# Patient Record
Sex: FEMALE | URBAN - METROPOLITAN AREA
[De-identification: names, ages, dates, MRNs, and addresses within clinical notes are randomized per-mention and may not be internally consistent; named-entity substitution may affect disease eponyms.]

---

## 2020-08-17 ENCOUNTER — APPOINTMENT (OUTPATIENT)
Age: 25
Setting detail: DERMATOLOGY
End: 2020-08-24

## 2020-08-17 VITALS — TEMPERATURE: 98.7 F

## 2020-08-17 DIAGNOSIS — L90.8 OTHER ATROPHIC DISORDERS OF SKIN: ICD-10-CM

## 2020-08-17 PROCEDURE — OTHER RESTYLANE INJECTION: OTHER

## 2020-08-17 NOTE — PROCEDURE: RESTYLANE INJECTION
Expiration Date (Month Year): 05/31/2022 You can access the FollowMyHealth Patient Portal offered by [facility] by registering at the following website: [email or web address]. By joining [employer]’s FollowMyHealth portal, you will also be able to view your health information using other applications (apps) compatible with our system.

## 2020-08-31 ENCOUNTER — APPOINTMENT (OUTPATIENT)
Age: 25
Setting detail: DERMATOLOGY
End: 2020-09-02

## 2020-08-31 VITALS — TEMPERATURE: 98.3 F

## 2020-08-31 DIAGNOSIS — Z41.9 ENCOUNTER FOR PROCEDURE FOR PURPOSES OTHER THAN REMEDYING HEALTH STATE, UNSPECIFIED: ICD-10-CM

## 2020-08-31 PROCEDURE — OTHER ADDITIONAL NOTES: OTHER

## 2020-08-31 NOTE — PROCEDURE: ADDITIONAL NOTES
Detail Level: Simple
Additional Notes: Pt very pleased with filler, discussed getting a second syringe in amonth.

## 2023-05-16 ENCOUNTER — APPOINTMENT (OUTPATIENT)
Dept: URBAN - METROPOLITAN AREA CLINIC 298 | Age: 28
Setting detail: DERMATOLOGY
End: 2023-06-09

## 2023-05-16 DIAGNOSIS — L90.8 OTHER ATROPHIC DISORDERS OF SKIN: ICD-10-CM

## 2023-05-16 PROCEDURE — OTHER FILLERS: OTHER

## 2023-05-16 NOTE — PROCEDURE: FILLERS
[place] Filler  Volume In Cc: 0
Number Of Syringes (Required For Inventory): 1
Anesthesia Type: 1% lidocaine with epinephrine
Inventory Information: This plan will send filler information to inventory based on the fillers you select. Multiple fillers can be sent but you must ensure you select the appropriate fillers in the inventory tab.
Detail Level: Detailed
Include Cannula Information In Note?: No
Anesthesia Volume In Cc: 0.5
Show Inventory Tab: Show
Additional Anesthesia Volume In Cc: 6
Topical Anesthesia?: lidocaine prilocaine 20% phenenylephrine 0.25%
Consent: Written consent obtained. Risks include but not limited to bruising, beading, irregular texture, ulceration, infection, allergic reaction, scar formation, incomplete augmentation, temporary nature, procedural pain.
Post-Care Instructions: Patient instructed to apply ice to reduce swelling.
Filler: Restylane Kysse
Map Statment: See Attach Map for Complete Details

## 2024-01-08 ENCOUNTER — APPOINTMENT (OUTPATIENT)
Dept: URBAN - METROPOLITAN AREA CLINIC 298 | Age: 29
Setting detail: DERMATOLOGY
End: 2024-01-12

## 2024-01-08 DIAGNOSIS — Z41.9 ENCOUNTER FOR PROCEDURE FOR PURPOSES OTHER THAN REMEDYING HEALTH STATE, UNSPECIFIED: ICD-10-CM

## 2024-01-08 PROCEDURE — OTHER CHEMICAL PEEL: OTHER

## 2024-01-08 ASSESSMENT — LOCATION SIMPLE DESCRIPTION DERM
LOCATION SIMPLE: RIGHT CHEEK
LOCATION SIMPLE: LEFT CHEEK
LOCATION SIMPLE: LEFT LIP
LOCATION SIMPLE: LEFT FOREHEAD

## 2024-01-08 ASSESSMENT — LOCATION DETAILED DESCRIPTION DERM
LOCATION DETAILED: LEFT LOWER CUTANEOUS LIP
LOCATION DETAILED: LEFT INFERIOR CENTRAL MALAR CHEEK
LOCATION DETAILED: LEFT MEDIAL FOREHEAD
LOCATION DETAILED: RIGHT INFERIOR CENTRAL MALAR CHEEK

## 2024-01-08 ASSESSMENT — LOCATION ZONE DERM
LOCATION ZONE: LIP
LOCATION ZONE: FACE

## 2024-01-08 NOTE — PROCEDURE: CHEMICAL PEEL
Consent: Prior to the procedure, written consent was obtained and risks were reviewed, including but not limited to: redness, peeling, blistering, pigmentary change, scarring, infection, and pain.
Price (Use Numbers Only, No Special Characters Or $): 175
Detail Level: Zone
Prep: The treated area was degreased with pre-peel cleanser, and vaseline was applied for protection of mucous membranes.
Treatment Number: 0
Post-Care Instructions: I reviewed with the patient in detail post-care instructions. Patient should avoid sun exposure and wear sun protection.
Post Peel Care: After the procedure, a post-peel cream was applied to the treated areas. Sun protection and post-care instructions were reviewed with the patient.
Chemical Peel: Glytone Peel

## 2024-01-10 ENCOUNTER — APPOINTMENT (OUTPATIENT)
Dept: URBAN - METROPOLITAN AREA CLINIC 298 | Age: 29
Setting detail: DERMATOLOGY
End: 2024-01-13

## 2024-01-10 DIAGNOSIS — L90.8 OTHER ATROPHIC DISORDERS OF SKIN: ICD-10-CM

## 2024-01-10 PROCEDURE — OTHER FILLERS: OTHER

## 2024-01-10 NOTE — PROCEDURE: FILLERS
[place]rows Filler Volume In Cc: 0
Show Inventory Tab: Show
Number Of Syringes (Required For Inventory): 1
Anesthesia Volume In Cc: 0.5
Include Cannula Information In Note?: No
Topical Anesthesia?: 23% lidocaine, 7% tetracaine
Additional Area 1 Location: smile lines
Map Statment: See Attach Map for Complete Details
Consent: Written consent obtained. Risks include but not limited to bruising, beading, irregular texture, ulceration, infection, allergic reaction, scar formation, incomplete augmentation, temporary nature, procedural pain.
Post-Care Instructions: Patient instructed to apply ice to reduce swelling.
Additional Area 2 Location: earlobes
Filler: RHA 2
Inventory Information: This plan will send filler information to inventory based on the fillers you select. Multiple fillers can be sent but you must ensure you select the appropriate fillers in the inventory tab.
Detail Level: Detailed
Anesthesia Type: 1% lidocaine with epinephrine

## 2025-01-02 ENCOUNTER — APPOINTMENT (OUTPATIENT)
Dept: URBAN - METROPOLITAN AREA CLINIC 298 | Age: 30
Setting detail: DERMATOLOGY
End: 2025-01-02

## 2025-01-02 DIAGNOSIS — L90.8 OTHER ATROPHIC DISORDERS OF SKIN: ICD-10-CM

## 2025-01-02 PROCEDURE — OTHER REVANESSE LIPS + INJECTION: OTHER

## 2025-01-02 NOTE — PROCEDURE: REVANESSE LIPS + INJECTION
Anesthesia Volume In Cc: 0.5
Mid Face Filler Volume In Cc: 0
Consent: Written consent obtained. Risks include but not limited to bruising, beading, irregular texture, ulceration, infection, allergic reaction, scar formation, incomplete augmentation, temporary nature, procedural pain.
Include Cannula Information In Note?: No
Detail Level: Detailed
Lot #: [id number]
Number Of Syringes (Required For Inventory): 1
Post-Care Instructions: Patient instructed to apply ice to reduce swelling.
Procedural Text: The filler was administered to the treatment areas noted above.
Filler: Revanesse Lips +
Show Inventory Tab: Show
Additional Area 1 Location: oral commisures
Additional Anesthesia Volume In Cc: 6
Expiration Date (Month Year): 3/21/26
Map Statment: See Attach Map for Complete Details